# Patient Record
Sex: FEMALE | Race: WHITE
[De-identification: names, ages, dates, MRNs, and addresses within clinical notes are randomized per-mention and may not be internally consistent; named-entity substitution may affect disease eponyms.]

---

## 2020-11-19 ENCOUNTER — HOSPITAL ENCOUNTER (INPATIENT)
Dept: HOSPITAL 54 - ER | Age: 25
LOS: 2 days | Discharge: HOME | DRG: 394 | End: 2020-11-21
Attending: NURSE PRACTITIONER | Admitting: NURSE PRACTITIONER
Payer: COMMERCIAL

## 2020-11-19 VITALS — DIASTOLIC BLOOD PRESSURE: 81 MMHG | SYSTOLIC BLOOD PRESSURE: 133 MMHG

## 2020-11-19 VITALS — WEIGHT: 185 LBS | HEIGHT: 65 IN | BODY MASS INDEX: 30.82 KG/M2

## 2020-11-19 VITALS — SYSTOLIC BLOOD PRESSURE: 133 MMHG | DIASTOLIC BLOOD PRESSURE: 81 MMHG

## 2020-11-19 VITALS — SYSTOLIC BLOOD PRESSURE: 122 MMHG | DIASTOLIC BLOOD PRESSURE: 81 MMHG

## 2020-11-19 DIAGNOSIS — D72.829: ICD-10-CM

## 2020-11-19 DIAGNOSIS — N20.1: ICD-10-CM

## 2020-11-19 DIAGNOSIS — I87.8: ICD-10-CM

## 2020-11-19 DIAGNOSIS — K35.80: Primary | ICD-10-CM

## 2020-11-19 DIAGNOSIS — F41.9: ICD-10-CM

## 2020-11-19 DIAGNOSIS — F32.9: ICD-10-CM

## 2020-11-19 DIAGNOSIS — E28.2: ICD-10-CM

## 2020-11-19 DIAGNOSIS — N80.9: ICD-10-CM

## 2020-11-19 LAB
ALBUMIN SERPL BCP-MCNC: 4 G/DL (ref 3.4–5)
ALP SERPL-CCNC: 74 U/L (ref 46–116)
ALT SERPL W P-5'-P-CCNC: 33 U/L (ref 12–78)
AST SERPL W P-5'-P-CCNC: 10 U/L (ref 15–37)
BASOPHILS # BLD AUTO: 0 /CMM (ref 0–0.2)
BASOPHILS NFR BLD AUTO: 0.2 % (ref 0–2)
BILIRUB DIRECT SERPL-MCNC: 0.1 MG/DL (ref 0–0.2)
BILIRUB SERPL-MCNC: 0.4 MG/DL (ref 0.2–1)
BILIRUB UR QL STRIP: NEGATIVE
BUN SERPL-MCNC: 13 MG/DL (ref 7–18)
CALCIUM SERPL-MCNC: 8.9 MG/DL (ref 8.5–10.1)
CHLORIDE SERPL-SCNC: 106 MMOL/L (ref 98–107)
CO2 SERPL-SCNC: 18 MMOL/L (ref 21–32)
COLOR UR: YELLOW
CREAT SERPL-MCNC: 0.9 MG/DL (ref 0.6–1.3)
EOSINOPHIL NFR BLD AUTO: 1.5 % (ref 0–6)
GLUCOSE SERPL-MCNC: 124 MG/DL (ref 74–106)
GLUCOSE UR STRIP-MCNC: NEGATIVE MG/DL
HCT VFR BLD AUTO: 41 % (ref 33–45)
HGB BLD-MCNC: 13.6 G/DL (ref 11.5–14.8)
HGB UR QL STRIP: (no result) ERY/UL
LEUKOCYTE ESTERASE UR QL STRIP: NEGATIVE
LIPASE SERPL-CCNC: 65 U/L (ref 73–393)
LYMPHOCYTES NFR BLD AUTO: 34 % (ref 20–44)
LYMPHOCYTES NFR BLD AUTO: 4.9 /CMM (ref 0.8–4.8)
MCHC RBC AUTO-ENTMCNC: 33 G/DL (ref 31–36)
MCV RBC AUTO: 83 FL (ref 82–100)
MONOCYTES NFR BLD AUTO: 0.9 /CMM (ref 0.1–1.3)
MONOCYTES NFR BLD AUTO: 6.5 % (ref 2–12)
NEUTROPHILS # BLD AUTO: 8.3 /CMM (ref 1.8–8.9)
NEUTROPHILS NFR BLD AUTO: 57.8 % (ref 43–81)
NITRITE UR QL STRIP: NEGATIVE
PH UR STRIP: 6 [PH] (ref 5–8)
PLATELET # BLD AUTO: 404 /CMM (ref 150–450)
POTASSIUM SERPL-SCNC: 3.2 MMOL/L (ref 3.5–5.1)
PROT SERPL-MCNC: 7.8 G/DL (ref 6.4–8.2)
PROT UR QL STRIP: NEGATIVE MG/DL
RBC # BLD AUTO: 4.91 MIL/UL (ref 4–5.2)
RBC #/AREA URNS HPF: (no result) /HPF (ref 0–2)
SODIUM SERPL-SCNC: 139 MMOL/L (ref 136–145)
UROBILINOGEN UR STRIP-MCNC: 0.2 EU/DL
WBC #/AREA URNS HPF: (no result) /HPF (ref 0–3)
WBC NRBC COR # BLD AUTO: 14.4 K/UL (ref 4.3–11)

## 2020-11-19 PROCEDURE — G0378 HOSPITAL OBSERVATION PER HR: HCPCS

## 2020-11-19 RX ADMIN — SODIUM CHLORIDE PRN MLS/HR: 9 INJECTION, SOLUTION INTRAVENOUS at 16:47

## 2020-11-19 RX ADMIN — PIPERACILLIN SODIUM AND TAZOBACTAM SODIUM SCH MLS/HR: .375; 3 INJECTION, POWDER, LYOPHILIZED, FOR SOLUTION INTRAVENOUS at 18:40

## 2020-11-19 NOTE — NUR
MS RN NOTES

RECEIVED ON BED A/O X4,BREATHING REGULAR,AMBULATORY,IVF NS AT 75ML/HR RATE IN PROGRESS ON 
RIGHT AC SALINE LOCK,VIA IV PUMP.NO S/S OF INFILTRATION NOTED.PAIN TOLERABLE AT THE 
MOMENT,CALL LIGHT IN REACH,NEEDS ANTICIPATED.

## 2020-11-19 NOTE — NUR
MS RN NOTES

PATIENT WANTS HER ANTI DEPRESSANT MEDICINE.HOSPITALIST IGOR MADE AWARE,SHE SAID OKAY TO 
CONTINUE.WILL START TOMORROW MORNING.PATIENT WILL CALL FAMILY MEMBER TO BRING HER PRISTIQ 
MEDS.

## 2020-11-19 NOTE — NUR
RN MS NOTES



RECEIVED PATIENT IN STABLE CONDITION VIA GURNEY FROM ED. PT HERE WITH C/O BACK PAIN AND LT 
SIDE FLANK PAIN STARTED THIS MORNING. ADMINISSION AND ORIENTATION TO ROOM DONE. IV TO RT AC 
#18G PATENT AND INTACT. VITALS TAKEN AND STABLE. PT IN STABLE CONDITION

## 2020-11-19 NOTE — NUR
IV ACCESS STARTED ON L AC G18. BLOO DRAW DONE. SENT TO LAB. URINE COLLECTED AS 
WELL AND SENT TO LAB

## 2020-11-19 NOTE — NUR
RN MS CLOSING NOTES



PT IN BED RESTING COMFORTABLY IN BED. ON RA WITH EVEN UNLABORED BREATHING. NO SIGNS OR C/O 
SOB NOTED. PT C/O PAIN 1/10 TYLENOL 650 GIVEN. IV TO RT AC #18G PATENT AND INTACT RUNNING NS 
@75 ML/HR. BED AT LOWEST POSITION AND LOCKED WITH CALL LIGHT WITHIN REACH WILL ENDORSE TO 
ONCOMING SHIFT.

## 2020-11-19 NOTE — NUR
PT BIB SELF C/O L FLANK PAIN R/T LOWER ABDOMEN AND GROIN AREA SINCE THIS 
MORNING. VS CHECKED. SEEN BY MD

## 2020-11-20 VITALS — DIASTOLIC BLOOD PRESSURE: 82 MMHG | SYSTOLIC BLOOD PRESSURE: 132 MMHG

## 2020-11-20 VITALS — DIASTOLIC BLOOD PRESSURE: 79 MMHG | SYSTOLIC BLOOD PRESSURE: 126 MMHG

## 2020-11-20 VITALS — SYSTOLIC BLOOD PRESSURE: 127 MMHG | DIASTOLIC BLOOD PRESSURE: 71 MMHG

## 2020-11-20 LAB
BASOPHILS # BLD AUTO: 0 /CMM (ref 0–0.2)
BASOPHILS NFR BLD AUTO: 0.4 % (ref 0–2)
BUN SERPL-MCNC: 9 MG/DL (ref 7–18)
CALCIUM SERPL-MCNC: 8.7 MG/DL (ref 8.5–10.1)
CHLORIDE SERPL-SCNC: 105 MMOL/L (ref 98–107)
CHOLEST SERPL-MCNC: 191 MG/DL (ref ?–200)
CO2 SERPL-SCNC: 22 MMOL/L (ref 21–32)
CREAT SERPL-MCNC: 0.7 MG/DL (ref 0.6–1.3)
EOSINOPHIL NFR BLD AUTO: 1.4 % (ref 0–6)
GLUCOSE SERPL-MCNC: 91 MG/DL (ref 74–106)
HCT VFR BLD AUTO: 40 % (ref 33–45)
HDLC SERPL-MCNC: 38 MG/DL (ref 40–60)
HGB BLD-MCNC: 13.1 G/DL (ref 11.5–14.8)
LDLC SERPL DIRECT ASSAY-MCNC: 132 MG/DL (ref 0–99)
LYMPHOCYTES NFR BLD AUTO: 4 /CMM (ref 0.8–4.8)
LYMPHOCYTES NFR BLD AUTO: 44.7 % (ref 20–44)
MAGNESIUM SERPL-MCNC: 2.3 MG/DL (ref 1.8–2.4)
MCHC RBC AUTO-ENTMCNC: 33 G/DL (ref 31–36)
MCV RBC AUTO: 84 FL (ref 82–100)
MONOCYTES NFR BLD AUTO: 0.7 /CMM (ref 0.1–1.3)
MONOCYTES NFR BLD AUTO: 7.8 % (ref 2–12)
NEUTROPHILS # BLD AUTO: 4.1 /CMM (ref 1.8–8.9)
NEUTROPHILS NFR BLD AUTO: 45.7 % (ref 43–81)
PHOSPHATE SERPL-MCNC: 3.3 MG/DL (ref 2.5–4.9)
PLATELET # BLD AUTO: 388 /CMM (ref 150–450)
POTASSIUM SERPL-SCNC: 4 MMOL/L (ref 3.5–5.1)
RBC # BLD AUTO: 4.72 MIL/UL (ref 4–5.2)
SODIUM SERPL-SCNC: 138 MMOL/L (ref 136–145)
TRIGL SERPL-MCNC: 113 MG/DL (ref 30–150)
WBC NRBC COR # BLD AUTO: 9 K/UL (ref 4.3–11)

## 2020-11-20 RX ADMIN — TOPIRAMATE SCH MG: 25 TABLET, COATED ORAL at 16:15

## 2020-11-20 RX ADMIN — SODIUM CHLORIDE PRN MLS/HR: 9 INJECTION, SOLUTION INTRAVENOUS at 21:31

## 2020-11-20 RX ADMIN — TOPIRAMATE SCH MG: 25 TABLET, COATED ORAL at 08:23

## 2020-11-20 RX ADMIN — PIPERACILLIN SODIUM AND TAZOBACTAM SODIUM SCH MLS/HR: .375; 3 INJECTION, POWDER, LYOPHILIZED, FOR SOLUTION INTRAVENOUS at 01:47

## 2020-11-20 RX ADMIN — PIPERACILLIN SODIUM AND TAZOBACTAM SODIUM SCH MLS/HR: .375; 3 INJECTION, POWDER, LYOPHILIZED, FOR SOLUTION INTRAVENOUS at 09:51

## 2020-11-20 RX ADMIN — PIPERACILLIN SODIUM AND TAZOBACTAM SODIUM SCH MLS/HR: .375; 3 INJECTION, POWDER, LYOPHILIZED, FOR SOLUTION INTRAVENOUS at 18:35

## 2020-11-20 RX ADMIN — SODIUM CHLORIDE PRN MLS/HR: 9 INJECTION, SOLUTION INTRAVENOUS at 08:22

## 2020-11-20 NOTE — NUR
PT C/O SHARP GUARDING LEFT ABD PAIN OF 6/10. VS WNL. PER PATIENT REQUEST NORCO 5-325MG PO 
Q4HR PRN ADMINISTERED AT THIS TIME PER ORDER. WILL CONTINUE TO MONITOR

## 2020-11-20 NOTE — NUR
PT TRANSPORTED BY WHEEL CHAIR BACK TO ROOM FROM CT ABD/PELVIS WITH CONTRAST BY [facility] X-RAY 
TECH, WILL CONTINUE WITH PLAN OF CARE

## 2020-11-20 NOTE — NUR
PT SCHEDULED FOR LAPAROSCOPIC APPENDECTOMY POSSIBLE OPEN/DIAGNOSTIC LAPAROTOMY PROCEDURE. PT 
SIGNED CONSENT FOR PROCEDURE, CONSENT FOR BLOOD AND CONSENT FOR ANESTHESIA. PRE-OP PROCEDURE 
CHECKLIST COMPLETED AND FILED IN CHART. WILL CONTINUE TO MONITOR AND CONTINUE WITH PLAN OF 
CARE

## 2020-11-20 NOTE — NUR
PT TRANSPORTED BY WHEEL CHAIR FOR CT ABD/PELVIS WITH CONTRAST BY [facility] X-RAY TECH, WILL 
CONTINUE WITH PLAN OF CARE

## 2020-11-20 NOTE — NUR
rn notes:

per pt she takes pristiq 50mg (2tablet total of 100mg) po at night only before bedtime. 
informed md on call, per md okay to continue medication, order rad back verified and carried 
out. . relayed to pharmacy stated they will send the said medication and put it in the pt's 
cassette.

## 2020-11-20 NOTE — NUR
RN OPENING NOTES



RECEIVED PT AWAKE IN BED AT THIS TIME. PT AOX4. PT ABLE TO VERBALIZE NEEDS. NO SOB NOTED, NO 
S/S OF ANY ACUTE DISTRESS NOTED.  NO C/O PAIN AT THIS TIME. RESPIRATIONS ARE EVEN AND 
UNLABORED WITH EQUAL RISE AND FALL IN CHEST. PT IS STABLE ON RA. IV ACCESS NOTED IN RAC G# 
20, INTACT, PATENT AND FLUSHING WELL. SAFETY PRECAUTION IN PLACE AND MAINTAINED AT ALL 
TIMES. BED IN LOWEST LOCKED POSITION, HOB ELEVATED, SIDE RAILS UP X 2, CALL LIGHT AND TABLE 
WITHIN REACH. WILL CONTINUE TO MONITOR.

## 2020-11-20 NOTE — NUR
PT C/O OF NAUSEA AT THIS TIME. PER PT REQUEST ZOFRAN 4MG IVP Q6HR PRN FOR NAUSEA/VOMITING 
ADMINISTERED PER ORDER. WILL CONTINUE TO MONITOR

## 2020-11-20 NOTE — NUR
MS RN CLOSING NOTES



PT AWAKE IN BED AT THIS TIME.  PT REMAINED STABLE THROUGHOUT SHIFT. ALL CARE, NEEDS, 
MEDICATION AND TREATMENT ADMINISTERED AS ANTICIPATED PER ORDER. PAIN MANAGEMENT AND NAUSEA 
CONTROL ADMINISTERED PER ORDER. SAFETY PRECAUTION IN PLACE AND MAINTAINED AT ALL TIMES. BED 
IN LOWEST LOCKED POSITION, HOB ELEVATED, SIDE RAILS UP X 2, CALL LIGHT AND TABLE WITHIN 
REACH. WILL ENDORSE TO NIGHT SHIFT NURSE FOR NUBIA

## 2020-11-20 NOTE — NUR
MS RN NOTES

SLEPT WELL AT NIGHT.HEADACHE IMPROVED,ABDOMINAL PAIN IMPROVED.REMAINS ON CLEAR LIQUID,TILL 
SEEN FOR SURGICAL EVALUATION.WILL ENDORSE TO DAY NURSE FOR NUBIA.

## 2020-11-20 NOTE — NUR
RN ASSESSMENT:

RECEIVED REPORT FROM ONDINA LIANG @ [id number]. PT IN BED, AWAKE, A/O X4, ON RA RESPIRATIONS EVEN AND 
UNLABORED. PT STARTED ON CLEAR LIQUID DIET, ACCDG TO PT SHE TOLERATED IT, NO N/V, BUT SHE 
DOESN'T LIKE SWEETS OR ARTIFICIAL SWEETENER, SHES SENSITIVE. IV ACCESS PATENT AND FLUSHING 
WELL, INFUSING WITH NS AT 75ML/HR. DISCUSSED PLAN OF CARE TO PT, PT AGREE AND UNDERSTAND. 
SAFETY PRECAUTIONS FOR FALL INITIATED, CALL LIGHT IN REACH, WILL MONITOR PT ACCORDINGLY.

## 2020-11-20 NOTE — NUR
RECEIVED ORDERS FOR CT ABD/PELVIS WITH CONTRAST FROM DR CISNEROS. ORDERS ENTERED AND CARRIED 
OUT. CONSENT FOR CT ABD/PELVIS SIGNED BY PATIENT AND FILED IN CHART. WILL CONTINUE WITH PLAN 
OF CARE

## 2020-11-21 VITALS — DIASTOLIC BLOOD PRESSURE: 80 MMHG | SYSTOLIC BLOOD PRESSURE: 120 MMHG

## 2020-11-21 VITALS — DIASTOLIC BLOOD PRESSURE: 83 MMHG | SYSTOLIC BLOOD PRESSURE: 126 MMHG

## 2020-11-21 LAB
BASOPHILS # BLD AUTO: 0.1 /CMM (ref 0–0.2)
BASOPHILS NFR BLD AUTO: 0.6 % (ref 0–2)
BUN SERPL-MCNC: 7 MG/DL (ref 7–18)
CALCIUM SERPL-MCNC: 8.9 MG/DL (ref 8.5–10.1)
CHLORIDE SERPL-SCNC: 104 MMOL/L (ref 98–107)
CO2 SERPL-SCNC: 20 MMOL/L (ref 21–32)
CREAT SERPL-MCNC: 0.8 MG/DL (ref 0.6–1.3)
EOSINOPHIL NFR BLD AUTO: 1.7 % (ref 0–6)
GLUCOSE SERPL-MCNC: 91 MG/DL (ref 74–106)
HCT VFR BLD AUTO: 38 % (ref 33–45)
HGB BLD-MCNC: 13.1 G/DL (ref 11.5–14.8)
LYMPHOCYTES NFR BLD AUTO: 3.3 /CMM (ref 0.8–4.8)
LYMPHOCYTES NFR BLD AUTO: 36.8 % (ref 20–44)
MAGNESIUM SERPL-MCNC: 2.3 MG/DL (ref 1.8–2.4)
MCHC RBC AUTO-ENTMCNC: 34 G/DL (ref 31–36)
MCV RBC AUTO: 82 FL (ref 82–100)
MONOCYTES NFR BLD AUTO: 0.7 /CMM (ref 0.1–1.3)
MONOCYTES NFR BLD AUTO: 7.8 % (ref 2–12)
NEUTROPHILS # BLD AUTO: 4.7 /CMM (ref 1.8–8.9)
NEUTROPHILS NFR BLD AUTO: 53.1 % (ref 43–81)
PHOSPHATE SERPL-MCNC: 2.9 MG/DL (ref 2.5–4.9)
PLATELET # BLD AUTO: 384 /CMM (ref 150–450)
POTASSIUM SERPL-SCNC: 3.6 MMOL/L (ref 3.5–5.1)
RBC # BLD AUTO: 4.66 MIL/UL (ref 4–5.2)
SODIUM SERPL-SCNC: 137 MMOL/L (ref 136–145)
WBC NRBC COR # BLD AUTO: 8.9 K/UL (ref 4.3–11)

## 2020-11-21 RX ADMIN — TOPIRAMATE SCH MG: 25 TABLET, COATED ORAL at 08:40

## 2020-11-21 RX ADMIN — PIPERACILLIN SODIUM AND TAZOBACTAM SODIUM SCH MLS/HR: .375; 3 INJECTION, POWDER, LYOPHILIZED, FOR SOLUTION INTRAVENOUS at 01:00

## 2020-11-21 RX ADMIN — PIPERACILLIN SODIUM AND TAZOBACTAM SODIUM SCH MLS/HR: .375; 3 INJECTION, POWDER, LYOPHILIZED, FOR SOLUTION INTRAVENOUS at 11:08

## 2020-11-21 NOTE — NUR
DC INSTRUCTIONS GIVEN. HEP LOCK REMOVED.HAS RXS.VERB. UNDERSTANDING OF ALL 
INSTRUCTIONS.BELONGING SHEET SIGNED.[facility] CALLED FOR TRANSPORT HOME BY PT. TAKEN TO LOBBY VIA 
W/C ACCOMPANIED BY CNA.PT. HAS ALL REPORTS.TO FOLLOW UP WITH OB,GYN MD AS WELL AS INTERNIST.

## 2020-11-21 NOTE — NUR
end of shift report:

no further complaints noted, pt stated her pain much improved from admission, 1/10 
tolerable. iv access patent and flushing well, infusing with ns at 75ml/hr, no s/s of iv 
infiltration noted. pt tolerated clear liquid diet well, no episode of n/v noted throughout 
the shift. remains afebrile. no untoward event happened throughout the shift. vs remains 
stable, needs attended. PLAN OF CARE: awaiting OB-GYN consult, ADAT, continue iv atb and 
hydration. Safety precautions for fall remains engaged, call light in reach, will endorse to 
day rn for continuity of care.